# Patient Record
Sex: FEMALE | Race: WHITE
[De-identification: names, ages, dates, MRNs, and addresses within clinical notes are randomized per-mention and may not be internally consistent; named-entity substitution may affect disease eponyms.]

---

## 2017-05-12 ENCOUNTER — HOSPITAL ENCOUNTER (OUTPATIENT)
Dept: HOSPITAL 80 - FIMAGING | Age: 47
End: 2017-05-12
Attending: GENERAL ACUTE CARE HOSPITAL
Payer: COMMERCIAL

## 2017-05-12 DIAGNOSIS — Z12.31: Primary | ICD-10-CM

## 2017-05-12 PROCEDURE — G0202 SCR MAMMO BI INCL CAD: HCPCS

## 2018-02-20 ENCOUNTER — HOSPITAL ENCOUNTER (OUTPATIENT)
Dept: HOSPITAL 80 - BMCIMAGING | Age: 48
End: 2018-02-20
Attending: INTERNAL MEDICINE
Payer: COMMERCIAL

## 2018-02-20 DIAGNOSIS — R07.9: Primary | ICD-10-CM

## 2018-04-03 ENCOUNTER — HOSPITAL ENCOUNTER (OUTPATIENT)
Dept: HOSPITAL 80 - FIMAGING | Age: 48
End: 2018-04-03
Attending: INTERNAL MEDICINE
Payer: COMMERCIAL

## 2018-04-03 DIAGNOSIS — M53.9: Primary | ICD-10-CM

## 2018-06-15 ENCOUNTER — HOSPITAL ENCOUNTER (OUTPATIENT)
Dept: HOSPITAL 80 - FIMAGING | Age: 48
End: 2018-06-15
Attending: OBSTETRICS & GYNECOLOGY
Payer: COMMERCIAL

## 2018-06-15 DIAGNOSIS — Z12.31: Primary | ICD-10-CM

## 2019-01-18 NOTE — GHP
PLANNED PROCEDURE:  Hysteroscopy with morcellation of endometrial tissue and an 
endometrial fibroid and endometrial ablation.



INDICATIONS:  Patient is a 48-year-old,  2, para 2-0-0-2, who has been 
having cycles every 23-30 days, which includes 6-7 days of gushing blood.  She 
uses a super plus tampon every few hours and has significant cramps.  She is 
having occasional hot flashes and a little bit of changes in mood and 
irritability before her cycles.  Pelvic ultrasound was obtained which showed a 
uterus measuring 9.6 x 5.1 x 5.7 cm with an endometrium measured at 0.83 cm and 
the anterior submucosal fibroid measuring 1.5 x 1.5 x 1.8 cm and an anterior 
intramural fibroid measuring 1.7 x 1.2 x 1.63 cm.  Her ovaries are 
unremarkable. 



Management options were reviewed extensively with the patient.  She would like 
to avoid a hysterectomy at this time if at all possible, and would like to 
avoid hormones.  She would like to proceed with a hysteroscopy with 
morcellation of endometrial tissue, morcellation of the fibroid and an 
endometrial ablation.  Risks and benefits have been extensively reviewed with 
the patient including the fact that there is a possibility in removing the 
fibroid that it will thin the wall of the uterus making the endometrial 
ablation not safe time, and the potential as needing to proceed with a 
hysterectomy risks and benefits.  The patient has been properly consented.



MEDICAL HISTORY:  Menorrhagia, cervical dysplasia.



MEDICATIONS:  None.



SURGICAL HISTORY:  LEEP excision of cervix, removal of ruptured bursa sac from 
her elbow.



ALLERGIES:  Flagyl.



SOCIAL HISTORY:  Patient is .  She denies tobacco or drug use.  She does 
drink 4 alcoholic beverages a week.  She works as a .



FAMILY MEDICAL HISTORY:  Noncontributory.



OB/GYN HISTORY:  Menarche age 14.  Periods every 23-28 days that are very, very 
heavy.  She is a  2, para 2-0-0-2.  She has had 2 spontaneous vaginal 
deliveries.



REVIEW OF SYSTEMS:  A 10-point review of systems is negative with the exception 
of the above-mentioned pertinent positives.



PHYSICAL EXAMINATION:  VITAL SIGNS:  The patient's vital signs are stable.  
GENERAL APPEARANCE:  Alert and oriented x3.  MUSCULOSKELETAL:  Grossly intact.  
NEURO:  Grossly intact.  PSYCH:  Appropriate affect.  HEART: Rate is regular. 
LUNGS:  Clear to auscultation bilaterally.  ABDOMEN:  Soft, nondistended, 
nontender. EXTREMITIES:  No calf tenderness or edema.  PELVIC:  Exam reveals a 
mobile midposition uterus with no adnexal masses.  Pelvic ultrasound is 
described above.



ASSESSMENT/PLAN:  A 48-year-old  2, para 2-0-0-2 with menorrhagia and a 
submucosal fibroid.  She will undergo hysteroscopy with morcellation of 
endometrial tissue and the fibroid, and ablation of the endometrium.  We 
discussed doing a biopsy ahead of time and the patient declines, but is aware 
that that if the pathology is abnormal at time of surgery, she will need to 
have a hysterectomy in the postop recovery period.  Risks and benefits have 
been reviewed with the patient and patient has been properly consented.





Job #:  [id number]

MTDD

## 2019-01-22 ENCOUNTER — HOSPITAL ENCOUNTER (OUTPATIENT)
Dept: HOSPITAL 80 - FSGY | Age: 49
Discharge: HOME | End: 2019-01-22
Attending: OBSTETRICS & GYNECOLOGY
Payer: COMMERCIAL

## 2019-01-22 VITALS — DIASTOLIC BLOOD PRESSURE: 84 MMHG | SYSTOLIC BLOOD PRESSURE: 111 MMHG

## 2019-01-22 DIAGNOSIS — D25.1: ICD-10-CM

## 2019-01-22 DIAGNOSIS — N92.0: Primary | ICD-10-CM

## 2019-01-22 DIAGNOSIS — D25.0: ICD-10-CM

## 2019-01-22 PROCEDURE — 0UDB8ZX EXTRACTION OF ENDOMETRIUM, VIA NATURAL OR ARTIFICIAL OPENING ENDOSCOPIC, DIAGNOSTIC: ICD-10-PCS | Performed by: OBSTETRICS & GYNECOLOGY

## 2019-01-22 PROCEDURE — 58558 HYSTEROSCOPY BIOPSY: CPT

## 2019-01-22 PROCEDURE — C1782 MORCELLATOR: HCPCS

## 2019-01-23 NOTE — GOP
DATE OF OPERATION:  2019



SURGEON:  Daxa Stanton DO



ANESTHESIA:  General with LMA.



PREOPERATIVE DIAGNOSIS:  Menorrhagia.



POSTOPERATIVE DIAGNOSIS:  Menorrhagia.



PROCEDURE PERFORMED:  Hysteroscopy with morcellation of endometrial tissue and partial morcellation o
f fibroid and endometrial ablation.



FINDINGS:  

1.  Exam under anesthesia:  Mobile midposition uterus with no adnexal masses. 

2.  Hysteroscopic findings:  Slightly thickened endometrium, submucosal fibroid anteriorly.  Bilatera
l tubal ostia visualized.



SPECIMENS:  Endometrial curettings.



ESTIMATED BLOOD LOSS:  10 cc.



INDICATIONS:  Patient is a 48-year-old,  2, para 2-0-0-2 who has been having cycles every 23 t
o 30 days, which are 6 to 7 days of gushing blood.  A pelvic ultrasound was obtained, which showed a 
submucosal fibroid, otherwise unremarkable uterus.  The lining was slightly thickened and the fibroid
 was submucosal, but did extend at least correction through into the myometrium, so management options w
ere reviewed extensively with the patient.  Decision was made to proceed with a hysteroscopy with mor
cellation of endometrial tissue, attempted morcellation of the fibroid, and endometrial ablation.  Ri
sks and benefits of the procedure were reviewed with the patient and patient was properly consented.



DESCRIPTION OF PROCEDURE:  Patient was taken to the operating room with intravenous fluids in place. 
 She was then placed on the operating room table in there dorsal supine position, where general anest
hesia was obtained.  She was given 100 mg of doxycycline intravenously.  She was then repositioned in
to the dorsal lithotomy position with the Yellofin stirrups and prepped and draped in normal sterile 
fashion.  



Exam under anesthesia revealed a mobile, midposition uterus with no adnexal masses.  A speculum was t
hen placed in the patient's vagina.  An Allis clamp was used to grasp the anterior lip of the cervix 
and the cervix was then dilated to allow for the introduction of an operative hysteroscope.  



The hysteroscope was then introduced with fluid medium running.  The endometrial cavity showed slight
ly thickened endometrium.  Bilateral tubal ostia were visualized.  The morcellator was then introduce
d and a circumferential morcellation was performed.  Upon morcellating the endometrium, an anterior s
ubmucosal fibroid was noted, and I attempted to remove much of the fibroid; however, due to concerns 
of extending into the myometrium too much to make the endometrial ablation safe, decision was made to
 only perform a partial myomectomy.  The cavity length was 8 cm.  Endometrial ablation was performed 
with the Britany following cavity assessment.  The Britany was then withdrawn and the hysteroscope wa
s then introduced.  Diffusely ablated endometrial cavity was noted.  



Instruments were then removed from the patient's vagina.  Bleeding was noted to be minimal.  Patient 
was transferred to recovery room in stable condition where she was easily awoken from anesthesia.  Sp
onge count was correct.  The patient was transferred to recovery room in stable condition.





Job #:  [id number]